# Patient Record
Sex: FEMALE | Race: WHITE | URBAN - METROPOLITAN AREA
[De-identification: names, ages, dates, MRNs, and addresses within clinical notes are randomized per-mention and may not be internally consistent; named-entity substitution may affect disease eponyms.]

---

## 2021-03-24 ENCOUNTER — OFFICE VISIT (OUTPATIENT)
Dept: URBAN - METROPOLITAN AREA CLINIC 54 | Facility: CLINIC | Age: 26
End: 2021-03-24
Payer: COMMERCIAL

## 2021-03-24 ENCOUNTER — WEB ENCOUNTER (OUTPATIENT)
Dept: URBAN - METROPOLITAN AREA CLINIC 54 | Facility: CLINIC | Age: 26
End: 2021-03-24

## 2021-03-24 DIAGNOSIS — R11.2 NON-INTRACTABLE VOMITING WITH NAUSEA, UNSPECIFIED VOMITING TYPE: ICD-10-CM

## 2021-03-24 DIAGNOSIS — K62.5 RECTAL BLEEDING: ICD-10-CM

## 2021-03-24 DIAGNOSIS — K58.2 IRRITABLE BOWEL SYNDROME WITH BOTH CONSTIPATION AND DIARRHEA: ICD-10-CM

## 2021-03-24 DIAGNOSIS — E66.01 MORBID (SEVERE) OBESITY DUE TO EXCESS CALORIES: ICD-10-CM

## 2021-03-24 DIAGNOSIS — K76.0 NAFLD (NONALCOHOLIC FATTY LIVER DISEASE): ICD-10-CM

## 2021-03-24 PROCEDURE — 99204 OFFICE O/P NEW MOD 45 MIN: CPT | Performed by: INTERNAL MEDICINE

## 2021-03-24 RX ORDER — DOCUSATE SODIUM 100 MG/1
1 CAPSULE AS NEEDED CAPSULE, LIQUID FILLED ORAL ONCE A DAY
Status: ON HOLD | COMMUNITY

## 2021-03-24 RX ORDER — LINAGLIPTIN 5 MG/1
1 TABLET TABLET, FILM COATED ORAL ONCE A DAY
Status: ACTIVE | COMMUNITY

## 2021-03-24 RX ORDER — NAPROXEN 500 MG/1
1 TABLET WITH FOOD OR MILK AS NEEDED TABLET ORAL
Status: ON HOLD | COMMUNITY

## 2021-03-24 RX ORDER — LEVOTHYROXINE SODIUM 0.03 MG/1
1 TABLET IN THE MORNING ON AN EMPTY STOMACH TABLET ORAL ONCE A DAY
Status: ACTIVE | COMMUNITY

## 2021-03-24 RX ORDER — SERTRALINE 100 MG/1
1 TABLET TABLET, FILM COATED ORAL ONCE A DAY
Status: ACTIVE | COMMUNITY

## 2021-03-24 RX ORDER — OMEPRAZOLE MAGNESIUM 10 MG/1
AS DIRECTED GRANULE, DELAYED RELEASE ORAL
Status: ON HOLD | COMMUNITY

## 2021-03-24 RX ORDER — PROMETHAZINE HYDROCHLORIDE AND PHENYLEPHRINE HYDROCHLORIDE 6.25; 5 MG/5ML; MG/5ML
5 ML AS NEEDED SOLUTION ORAL
Status: ON HOLD | COMMUNITY

## 2021-03-24 RX ORDER — ARIPIPRAZOLE 5 MG/1
1 TABLET TABLET ORAL ONCE A DAY
Status: ACTIVE | COMMUNITY

## 2021-03-24 RX ORDER — SUCRALFATE 1 G/1
1 TABLET ON AN EMPTY STOMACH TABLET ORAL TWICE A DAY
Status: ON HOLD | COMMUNITY

## 2021-03-24 RX ORDER — LEVOTHYROXINE SODIUM 137 UG/1
1 CAPSULE IN THE MORNING ON AN EMPTY STOMACH CAPSULE ORAL ONCE A DAY
Status: ON HOLD | COMMUNITY

## 2021-03-24 RX ORDER — BUSPIRONE HYDROCHLORIDE 10 MG/1
1 TABLET TABLET ORAL TWICE A DAY
Status: ACTIVE | COMMUNITY

## 2021-03-24 NOTE — HPI-TODAY'S VISIT:
25-year-old lady with morbid obesity BMI 47, diabetes, anxiety depression, and tobacco use who presents to GI clinic for rectal bleeding and nonalcoholic fatty liver disease. Patient reports to me that she is presented to the ED for rectal bleeding in February.  She has had 3-4 episodes where she feels the toilet bowl full of blood.  She also reports fatigue.  She tells me that her bowel movements are alternating we will change in frequency from one every 2 days to 3 to 4/day.  Consistency varies from loose to pebble-like.  She also reports abdominal bloating and distention.  She has not had a prior colonoscopy.  She has not had a prior upper endoscopy. She does not admit to using tobacco products vaping.  She rarely drinks alcohol.  She denies any marijuana or IV drug use. Family history is negative for any colon cancer or inflammatory bowel disease. Her last liver function test in February showed an AST of 51 ALT of 51 total bili 0.3 alk phos 66 hemoglobin was 13.8 platelets 219 INR was 1.2.  CT abdomen in February showed significant hepatomegaly and multiple subcentimeter retroperitoneal mesenteric lymph nodes.

## 2021-03-28 PROBLEM — 10743008: Status: ACTIVE | Noted: 2021-03-24

## 2021-04-29 ENCOUNTER — OFFICE VISIT (OUTPATIENT)
Dept: URBAN - METROPOLITAN AREA SURGERY CENTER 14 | Facility: SURGERY CENTER | Age: 26
End: 2021-04-29

## 2021-07-22 ENCOUNTER — OFFICE VISIT (OUTPATIENT)
Dept: URBAN - METROPOLITAN AREA SURGERY CENTER 14 | Facility: SURGERY CENTER | Age: 26
End: 2021-07-22

## 2022-04-25 ENCOUNTER — OFFICE VISIT (OUTPATIENT)
Dept: URBAN - METROPOLITAN AREA CLINIC 54 | Facility: CLINIC | Age: 27
End: 2022-04-25
Payer: COMMERCIAL

## 2022-04-25 DIAGNOSIS — R11.0 CHRONIC NAUSEA: ICD-10-CM

## 2022-04-25 DIAGNOSIS — K58.0 IRRITABLE BOWEL SYNDROME WITH DIARRHEA: ICD-10-CM

## 2022-04-25 DIAGNOSIS — R79.89 ELEVATED LFTS: ICD-10-CM

## 2022-04-25 PROCEDURE — 99214 OFFICE O/P EST MOD 30 MIN: CPT | Performed by: INTERNAL MEDICINE

## 2022-04-25 RX ORDER — BUSPIRONE HYDROCHLORIDE 10 MG/1
1 TABLET TABLET ORAL TWICE A DAY
Status: ACTIVE | COMMUNITY

## 2022-04-25 RX ORDER — LEVOTHYROXINE SODIUM 137 UG/1
1 CAPSULE IN THE MORNING ON AN EMPTY STOMACH CAPSULE ORAL ONCE A DAY
Status: ACTIVE | COMMUNITY

## 2022-04-25 RX ORDER — LEVOTHYROXINE SODIUM 0.03 MG/1
1 TABLET IN THE MORNING ON AN EMPTY STOMACH TABLET ORAL ONCE A DAY
Status: ACTIVE | COMMUNITY

## 2022-04-25 RX ORDER — SERTRALINE 100 MG/1
1 TABLET TABLET, FILM COATED ORAL ONCE A DAY
Status: ACTIVE | COMMUNITY

## 2022-04-25 RX ORDER — OMEPRAZOLE MAGNESIUM 10 MG/1
AS DIRECTED GRANULE, DELAYED RELEASE ORAL
Status: ACTIVE | COMMUNITY

## 2022-04-25 RX ORDER — LINAGLIPTIN 5 MG/1
1 TABLET TABLET, FILM COATED ORAL ONCE A DAY
Status: DISCONTINUED | COMMUNITY

## 2022-04-25 RX ORDER — DOCUSATE SODIUM 100 MG/1
1 CAPSULE AS NEEDED CAPSULE, LIQUID FILLED ORAL ONCE A DAY
Status: DISCONTINUED | COMMUNITY

## 2022-04-25 RX ORDER — SUCRALFATE 1 G/1
1 TABLET ON AN EMPTY STOMACH TABLET ORAL TWICE A DAY
Status: ACTIVE | COMMUNITY

## 2022-04-25 RX ORDER — NAPROXEN 500 MG/1
1 TABLET WITH FOOD OR MILK AS NEEDED TABLET ORAL
Status: DISCONTINUED | COMMUNITY

## 2022-04-25 RX ORDER — PROMETHAZINE HYDROCHLORIDE AND PHENYLEPHRINE HYDROCHLORIDE 6.25; 5 MG/5ML; MG/5ML
5 ML AS NEEDED SOLUTION ORAL
Status: DISCONTINUED | COMMUNITY

## 2022-04-25 RX ORDER — ARIPIPRAZOLE 5 MG/1
1 TABLET TABLET ORAL ONCE A DAY
Status: ACTIVE | COMMUNITY

## 2022-04-25 RX ORDER — ONDANSETRON HYDROCHLORIDE 8 MG/1
1 CAPSULE TABLET, FILM COATED ORAL
Qty: 90 | Refills: 0 | OUTPATIENT
Start: 2022-04-25 | End: 2022-05-25

## 2022-04-25 RX ORDER — DULAGLUTIDE 1.5 MG/.5ML
AS DIRECTED INJECTION, SOLUTION SUBCUTANEOUS
Status: ACTIVE | COMMUNITY

## 2022-04-25 NOTE — HPI-TODAY'S VISIT:
26-year-old morbidly obese woman with depression anxiety insulin-dependent diabetes and likely nonalcoholic fatty liver disease presents to GI clinic for follow-up after 1 year.  Patient was last seen in March 2021 for nausea vomiting and alternating bowel habits.  At that time we referred the patient for liver work-up and bidirectional endoscopy.  Patient never underwent any of the studies.  She has had 5 ER visits in the last 6 months.  She reports epigastric pain and bilateral lower quadrant pain the last 2 weeks.  She continues have nausea.  She reports his pain is unrelated to food.  She also has persistent alternating bowel habits.  This is been ongoing for a year.  She ranges from 1 bowel movement a day to 5 to 6/day of loose liquidy stool.  She had a CT abdomen on March 14 showed significant hepatomegaly with fatty infiltration of the liver.  Most recent LFTs show an AST of 74 and ALT of 82 with a total bili of 1.7 alk phos of 67.  Urine drug screen in the past has been positive for THC.  Most recent hemoglobin was 10.8 and platelets of 174.

## 2022-04-27 LAB
A/G RATIO: 1.2
ALBUMIN: 4.3
ALKALINE PHOSPHATASE: 70
ALT (SGPT): 59
AST (SGOT): 61
BILIRUBIN, TOTAL: 0.4
BUN/CREATININE RATIO: 11
BUN: 7
CALCIUM: 9.5
CARBON DIOXIDE, TOTAL: 18
CHLORIDE: 103
CREATININE: 0.66
EGFR: 126
ENDOMYSIAL ANTIBODY IGA: NEGATIVE
FERRITIN, SERUM: 40
GLOBULIN, TOTAL: 3.6
GLUCOSE: 301
HBSAG SCREEN: NEGATIVE
HCV AB: <0.1
HEMATOCRIT: 39.9
HEMOGLOBIN: 12
HEP A AB, IGM: NEGATIVE
HEP B CORE AB, IGM: NEGATIVE
IMMUNOGLOBULIN A, QN, SERUM: 266
INR: 1
INTERPRETATION:: (no result)
IRON BIND.CAP.(TIBC): 340
IRON SATURATION: 11
IRON: 36
MCH: 25.2
MCHC: 30.1
MCV: 84
NRBC: (no result)
PLATELETS: 179
POTASSIUM: 4.3
PROTEIN, TOTAL: 7.9
PROTHROMBIN TIME: 10.5
RBC: 4.77
RDW: 15.5
SODIUM: 136
T-TRANSGLUTAMINASE (TTG) IGA: <2
UIBC: 304
WBC: 6.5

## 2022-04-29 LAB — CALPROTECTIN, FECAL: 128

## 2022-05-02 ENCOUNTER — TELEPHONE ENCOUNTER (OUTPATIENT)
Dept: URBAN - METROPOLITAN AREA CLINIC 92 | Facility: CLINIC | Age: 27
End: 2022-05-02

## 2022-05-02 RX ORDER — ONDANSETRON HYDROCHLORIDE 8 MG/1
1 CAPSULE TABLET, FILM COATED ORAL
Qty: 90 | Refills: 0 | Status: ACTIVE | COMMUNITY
Start: 2022-04-25 | End: 2022-05-25

## 2022-05-02 RX ORDER — LEVOTHYROXINE SODIUM 0.03 MG/1
1 TABLET IN THE MORNING ON AN EMPTY STOMACH TABLET ORAL ONCE A DAY
Status: ACTIVE | COMMUNITY

## 2022-05-02 RX ORDER — DULAGLUTIDE 1.5 MG/.5ML
AS DIRECTED INJECTION, SOLUTION SUBCUTANEOUS
Status: ACTIVE | COMMUNITY

## 2022-05-02 RX ORDER — OMEPRAZOLE MAGNESIUM 10 MG/1
AS DIRECTED GRANULE, DELAYED RELEASE ORAL
Status: ACTIVE | COMMUNITY

## 2022-05-02 RX ORDER — LEVOTHYROXINE SODIUM 137 UG/1
1 CAPSULE IN THE MORNING ON AN EMPTY STOMACH CAPSULE ORAL ONCE A DAY
Status: ACTIVE | COMMUNITY

## 2022-05-02 RX ORDER — BUSPIRONE HYDROCHLORIDE 10 MG/1
1 TABLET TABLET ORAL TWICE A DAY
Status: ACTIVE | COMMUNITY

## 2022-05-02 RX ORDER — ARIPIPRAZOLE 5 MG/1
1 TABLET TABLET ORAL ONCE A DAY
Status: ACTIVE | COMMUNITY

## 2022-05-02 RX ORDER — SERTRALINE 100 MG/1
1 TABLET TABLET, FILM COATED ORAL ONCE A DAY
Status: ACTIVE | COMMUNITY

## 2022-05-02 RX ORDER — SODIUM, POTASSIUM,MAG SULFATES 17.5-3.13G
354 ML SOLUTION, RECONSTITUTED, ORAL ORAL
Qty: 354 ML | Refills: 0 | OUTPATIENT
Start: 2022-05-02 | End: 2022-05-03

## 2022-05-02 RX ORDER — SUCRALFATE 1 G/1
1 TABLET ON AN EMPTY STOMACH TABLET ORAL TWICE A DAY
Status: ACTIVE | COMMUNITY

## 2022-05-10 ENCOUNTER — OFFICE VISIT (OUTPATIENT)
Dept: URBAN - METROPOLITAN AREA MEDICAL CENTER 23 | Facility: MEDICAL CENTER | Age: 27
End: 2022-05-10
Payer: COMMERCIAL

## 2022-05-10 ENCOUNTER — TELEPHONE ENCOUNTER (OUTPATIENT)
Dept: URBAN - METROPOLITAN AREA CLINIC 92 | Facility: CLINIC | Age: 27
End: 2022-05-10

## 2022-05-10 DIAGNOSIS — K75.81 STEATOHEPATITIS: ICD-10-CM

## 2022-05-10 DIAGNOSIS — K74.69 CIRRHOSIS, CRYPTOGENIC: ICD-10-CM

## 2022-05-10 DIAGNOSIS — R59.0 INTRA-ABDOMINAL LYMPHADENOPATHY: ICD-10-CM

## 2022-05-10 DIAGNOSIS — K29.60 ADENOPAPILLOMATOSIS GASTRICA: ICD-10-CM

## 2022-05-10 PROCEDURE — 43242 EGD US FINE NEEDLE BX/ASPIR: CPT | Performed by: INTERNAL MEDICINE

## 2022-05-10 PROCEDURE — 43239 EGD BIOPSY SINGLE/MULTIPLE: CPT | Performed by: INTERNAL MEDICINE

## 2022-05-10 RX ORDER — ARIPIPRAZOLE 5 MG/1
1 TABLET TABLET ORAL ONCE A DAY
Status: ACTIVE | COMMUNITY

## 2022-05-10 RX ORDER — LEVOTHYROXINE SODIUM 137 UG/1
1 CAPSULE IN THE MORNING ON AN EMPTY STOMACH CAPSULE ORAL ONCE A DAY
Status: ACTIVE | COMMUNITY

## 2022-05-10 RX ORDER — DULAGLUTIDE 1.5 MG/.5ML
AS DIRECTED INJECTION, SOLUTION SUBCUTANEOUS
Status: ACTIVE | COMMUNITY

## 2022-05-10 RX ORDER — LEVOTHYROXINE SODIUM 0.03 MG/1
1 TABLET IN THE MORNING ON AN EMPTY STOMACH TABLET ORAL ONCE A DAY
Status: ACTIVE | COMMUNITY

## 2022-05-10 RX ORDER — SERTRALINE 100 MG/1
1 TABLET TABLET, FILM COATED ORAL ONCE A DAY
Status: ACTIVE | COMMUNITY

## 2022-05-10 RX ORDER — ONDANSETRON HYDROCHLORIDE 8 MG/1
1 CAPSULE TABLET, FILM COATED ORAL
Qty: 90 | Refills: 0 | Status: ACTIVE | COMMUNITY
Start: 2022-04-25 | End: 2022-05-25

## 2022-05-10 RX ORDER — SUCRALFATE 1 G/1
1 TABLET ON AN EMPTY STOMACH TABLET ORAL TWICE A DAY
Status: ACTIVE | COMMUNITY

## 2022-05-10 RX ORDER — BUSPIRONE HYDROCHLORIDE 10 MG/1
1 TABLET TABLET ORAL TWICE A DAY
Status: ACTIVE | COMMUNITY

## 2022-05-10 RX ORDER — OMEPRAZOLE MAGNESIUM 10 MG/1
AS DIRECTED GRANULE, DELAYED RELEASE ORAL
Status: ACTIVE | COMMUNITY

## 2022-05-19 ENCOUNTER — TELEPHONE ENCOUNTER (OUTPATIENT)
Dept: URBAN - METROPOLITAN AREA CLINIC 54 | Facility: CLINIC | Age: 27
End: 2022-05-19

## 2022-05-20 ENCOUNTER — TELEPHONE ENCOUNTER (OUTPATIENT)
Dept: URBAN - METROPOLITAN AREA CLINIC 54 | Facility: CLINIC | Age: 27
End: 2022-05-20

## 2022-05-23 ENCOUNTER — TELEPHONE ENCOUNTER (OUTPATIENT)
Dept: URBAN - METROPOLITAN AREA CLINIC 54 | Facility: CLINIC | Age: 27
End: 2022-05-23

## 2022-05-25 ENCOUNTER — CLAIMS CREATED FROM THE CLAIM WINDOW (OUTPATIENT)
Dept: URBAN - METROPOLITAN AREA CLINIC 4 | Facility: CLINIC | Age: 27
End: 2022-05-25
Payer: COMMERCIAL

## 2022-05-25 ENCOUNTER — OFFICE VISIT (OUTPATIENT)
Dept: URBAN - METROPOLITAN AREA SURGERY CENTER 14 | Facility: SURGERY CENTER | Age: 27
End: 2022-05-25
Payer: COMMERCIAL

## 2022-05-25 DIAGNOSIS — K52.9 CHRONIC DIARRHEA: ICD-10-CM

## 2022-05-25 DIAGNOSIS — K63.89 OTHER SPECIFIED DISEASES OF INTESTINE: ICD-10-CM

## 2022-05-25 PROCEDURE — 45380 COLONOSCOPY AND BIOPSY: CPT | Performed by: INTERNAL MEDICINE

## 2022-05-25 PROCEDURE — 88313 SPECIAL STAINS GROUP 2: CPT | Performed by: PATHOLOGY

## 2022-05-25 PROCEDURE — 88305 TISSUE EXAM BY PATHOLOGIST: CPT | Performed by: PATHOLOGY

## 2022-05-25 PROCEDURE — 88342 IMHCHEM/IMCYTCHM 1ST ANTB: CPT | Performed by: PATHOLOGY

## 2022-05-25 PROCEDURE — G8907 PT DOC NO EVENTS ON DISCHARG: HCPCS | Performed by: INTERNAL MEDICINE

## 2022-05-25 RX ORDER — DULAGLUTIDE 1.5 MG/.5ML
AS DIRECTED INJECTION, SOLUTION SUBCUTANEOUS
Status: ACTIVE | COMMUNITY

## 2022-05-25 RX ORDER — ARIPIPRAZOLE 5 MG/1
1 TABLET TABLET ORAL ONCE A DAY
Status: ACTIVE | COMMUNITY

## 2022-05-25 RX ORDER — BUSPIRONE HYDROCHLORIDE 10 MG/1
1 TABLET TABLET ORAL TWICE A DAY
Status: ACTIVE | COMMUNITY

## 2022-05-25 RX ORDER — LEVOTHYROXINE SODIUM 137 UG/1
1 CAPSULE IN THE MORNING ON AN EMPTY STOMACH CAPSULE ORAL ONCE A DAY
Status: ACTIVE | COMMUNITY

## 2022-05-25 RX ORDER — SUCRALFATE 1 G/1
1 TABLET ON AN EMPTY STOMACH TABLET ORAL TWICE A DAY
Status: ACTIVE | COMMUNITY

## 2022-05-25 RX ORDER — SERTRALINE 100 MG/1
1 TABLET TABLET, FILM COATED ORAL ONCE A DAY
Status: ACTIVE | COMMUNITY

## 2022-05-25 RX ORDER — OMEPRAZOLE MAGNESIUM 10 MG/1
AS DIRECTED GRANULE, DELAYED RELEASE ORAL
Status: ACTIVE | COMMUNITY

## 2022-05-25 RX ORDER — LEVOTHYROXINE SODIUM 0.03 MG/1
1 TABLET IN THE MORNING ON AN EMPTY STOMACH TABLET ORAL ONCE A DAY
Status: ACTIVE | COMMUNITY

## 2022-05-25 RX ORDER — ONDANSETRON HYDROCHLORIDE 8 MG/1
1 CAPSULE TABLET, FILM COATED ORAL
Qty: 90 | Refills: 0 | Status: ACTIVE | COMMUNITY
Start: 2022-04-25 | End: 2022-05-25

## 2022-06-06 ENCOUNTER — TELEPHONE ENCOUNTER (OUTPATIENT)
Dept: URBAN - METROPOLITAN AREA CLINIC 54 | Facility: CLINIC | Age: 27
End: 2022-06-06

## 2022-06-08 ENCOUNTER — OFFICE VISIT (OUTPATIENT)
Dept: URBAN - NONMETROPOLITAN AREA CLINIC 4 | Facility: CLINIC | Age: 27
End: 2022-06-08
Payer: COMMERCIAL

## 2022-06-08 ENCOUNTER — LAB OUTSIDE AN ENCOUNTER (OUTPATIENT)
Dept: URBAN - NONMETROPOLITAN AREA CLINIC 4 | Facility: CLINIC | Age: 27
End: 2022-06-08

## 2022-06-08 VITALS
HEART RATE: 91 BPM | WEIGHT: 293 LBS | HEIGHT: 67 IN | TEMPERATURE: 98.1 F | SYSTOLIC BLOOD PRESSURE: 139 MMHG | DIASTOLIC BLOOD PRESSURE: 79 MMHG | BODY MASS INDEX: 45.99 KG/M2

## 2022-06-08 DIAGNOSIS — R11.0 CHRONIC NAUSEA: ICD-10-CM

## 2022-06-08 DIAGNOSIS — K58.0 IRRITABLE BOWEL SYNDROME WITH DIARRHEA: ICD-10-CM

## 2022-06-08 DIAGNOSIS — Z79.4 LONG TERM (CURRENT) USE OF INSULIN: ICD-10-CM

## 2022-06-08 DIAGNOSIS — K75.81 NONALCOHOLIC STEATOHEPATITIS (NASH): ICD-10-CM

## 2022-06-08 DIAGNOSIS — E66.01 MORBID OBESITY: ICD-10-CM

## 2022-06-08 DIAGNOSIS — E11.9 TYPE 2 DIABETES MELLITUS WITHOUT COMPLICATIONS: ICD-10-CM

## 2022-06-08 DIAGNOSIS — K74.60 UNSPECIFIED CIRRHOSIS OF LIVER: ICD-10-CM

## 2022-06-08 DIAGNOSIS — D50.8 ACQUIRED IRON DEFICIENCY ANEMIA DUE TO DECREASED ABSORPTION: ICD-10-CM

## 2022-06-08 DIAGNOSIS — R93.5 ABNORMAL CT SCAN, PELVIS: ICD-10-CM

## 2022-06-08 PROBLEM — 249612005: Status: ACTIVE | Noted: 2022-05-11

## 2022-06-08 PROBLEM — 724556004: Status: ACTIVE | Noted: 2022-05-02

## 2022-06-08 PROBLEM — 710815001: Status: ACTIVE | Noted: 2022-06-08

## 2022-06-08 PROBLEM — 266468003: Status: ACTIVE | Noted: 2022-06-08

## 2022-06-08 PROBLEM — 197125005: Status: ACTIVE | Noted: 2022-04-25

## 2022-06-08 PROBLEM — 19943007: Status: ACTIVE | Noted: 2022-06-08

## 2022-06-08 PROBLEM — 422587007: Status: ACTIVE | Noted: 2022-04-28

## 2022-06-08 PROBLEM — 237599002: Status: ACTIVE | Noted: 2022-06-08

## 2022-06-08 PROCEDURE — 99215 OFFICE O/P EST HI 40 MIN: CPT | Performed by: INTERNAL MEDICINE

## 2022-06-08 PROCEDURE — 99214 OFFICE O/P EST MOD 30 MIN: CPT | Performed by: INTERNAL MEDICINE

## 2022-06-08 RX ORDER — LEVOTHYROXINE SODIUM 137 UG/1
1 CAPSULE IN THE MORNING ON AN EMPTY STOMACH CAPSULE ORAL ONCE A DAY
Status: ACTIVE | COMMUNITY

## 2022-06-08 RX ORDER — DULAGLUTIDE 1.5 MG/.5ML
AS DIRECTED INJECTION, SOLUTION SUBCUTANEOUS WEEKLY
Status: ACTIVE | COMMUNITY

## 2022-06-08 RX ORDER — BUSPIRONE HYDROCHLORIDE 10 MG/1
1 TABLET TABLET ORAL TWICE A DAY
Status: ACTIVE | COMMUNITY

## 2022-06-08 RX ORDER — LEVOTHYROXINE SODIUM 0.03 MG/1
1 TABLET IN THE MORNING ON AN EMPTY STOMACH TABLET ORAL ONCE A DAY
Status: ACTIVE | COMMUNITY

## 2022-06-08 RX ORDER — ARIPIPRAZOLE 5 MG/1
1 TABLET TABLET ORAL ONCE A DAY
Status: ACTIVE | COMMUNITY

## 2022-06-08 RX ORDER — SUCRALFATE 1 G/1
1 TABLET ON AN EMPTY STOMACH TABLET ORAL TWICE A DAY
Status: ACTIVE | COMMUNITY

## 2022-06-08 RX ORDER — INSULIN GLARGINE 300 U/ML
AS DIRECTED INJECTION, SOLUTION SUBCUTANEOUS
Status: ACTIVE | COMMUNITY

## 2022-06-08 RX ORDER — SERTRALINE 100 MG/1
1 TABLET TABLET, FILM COATED ORAL ONCE A DAY
Status: ACTIVE | COMMUNITY

## 2022-06-08 RX ORDER — OMEPRAZOLE MAGNESIUM 10 MG/1
AS DIRECTED GRANULE, DELAYED RELEASE ORAL
Status: ACTIVE | COMMUNITY

## 2022-06-08 NOTE — HPI-TODAY'S VISIT:
26-year-old morbidly obese woman with depression anxiety insulin-dependent diabetes and likely nonalcoholic fatty liver disease presents to GI clinic for follow-up after 1 year.  Patient was last seen in March 2021 for nausea vomiting and alternating bowel habits.  At that time we referred the patient for liver work-up and bidirectional endoscopy.  Patient never underwent any of the studies.  She has had 5 ER visits in the last 6 months.  She reports epigastric pain and bilateral lower quadrant pain the last 2 weeks.  She continues have nausea.  She reports his pain is unrelated to food.  She also has persistent alternating bowel habits.  This is been ongoing for a year.  She ranges from 1 bowel movement a day to 5 to 6/day of loose liquidy stool.  She had a CT abdomen on March 14 showed significant hepatomegaly with fatty infiltration of the liver.  Most recent LFTs show an AST of 74 and ALT of 82 with a total bili of 1.7 alk phos of 67.  Urine drug screen in the past has been positive for THC.  Most recent hemoglobin was 10.8 and platelets of 174.  6/8/22: Pt RTC for f/u. Had EGD/EUS 5/10/22: - No evidence of choledocholithiases/cholelithiasis - Hepatosplenomegaly - Markedly hyperechoic liver (concerning for BELL) s/p liver FNB - Intraabdominal lymphadenopathy of unclear etiology. 1 cm celiac lymph node s/p FNB. - Unremarkable upper endoscopy exam.  S/p gastric biopsies for H. Pylori. Gastric Bx -Mild to moderate chronic gastritis. Neg for HPY. Liver bx showing BELL cirrhosis  Had cscope 5/25/22: - The examined portion of the ileum was normal. - The sigmoid colon, descending colon, splenic flexure, transverse colon, hepatic flexure, ascending colon and cecum are normal. Biopsied. - Internal hemorrhoids. Bx pending  She went to [facility] ED on 6/6/22 with c/o vomiting coffee-ground emesis as well as having dark tarry stools.  Patient reports the emesis started as redness, and then progressed to brown/black.  Patient reports associated upper abdominal pain. BG was noted at 445. HgbA1c 11.8. Hgb 11.9. AST 72, ALT 61, Tbili and Alk phos wnl. INR 1.15. UA neg. FOBT neg.   Since discharge, she continues to c/o upper abdominal pain with associated nausea. Denies any further vomiting. Contiues to have alternating bowel habits, bloating and gas. She is following low FODMAP diet.  She does not currently follow an endocrinologist. She is on Trulicity and Toujeo. She lives in [place].

## 2022-07-18 ENCOUNTER — TELEPHONE ENCOUNTER (OUTPATIENT)
Dept: URBAN - METROPOLITAN AREA CLINIC 54 | Facility: CLINIC | Age: 27
End: 2022-07-18

## 2022-07-25 ENCOUNTER — OFFICE VISIT (OUTPATIENT)
Dept: URBAN - METROPOLITAN AREA CLINIC 54 | Facility: CLINIC | Age: 27
End: 2022-07-25
Payer: COMMERCIAL

## 2022-07-25 VITALS
SYSTOLIC BLOOD PRESSURE: 129 MMHG | TEMPERATURE: 97.7 F | HEART RATE: 82 BPM | WEIGHT: 293 LBS | DIASTOLIC BLOOD PRESSURE: 80 MMHG | HEIGHT: 67 IN | BODY MASS INDEX: 45.99 KG/M2

## 2022-07-25 DIAGNOSIS — R79.89 ELEVATED LFTS: ICD-10-CM

## 2022-07-25 DIAGNOSIS — E66.01 MORBID (SEVERE) OBESITY DUE TO EXCESS CALORIES: ICD-10-CM

## 2022-07-25 DIAGNOSIS — K76.0 NAFLD (NONALCOHOLIC FATTY LIVER DISEASE): ICD-10-CM

## 2022-07-25 PROBLEM — 863927004: Status: ACTIVE | Noted: 2022-04-28

## 2022-07-25 PROBLEM — 408512008: Status: ACTIVE | Noted: 2021-03-24

## 2022-07-25 PROBLEM — 83911000119104: Status: ACTIVE | Noted: 2021-03-24

## 2022-07-25 PROBLEM — 197315008: Status: ACTIVE | Noted: 2021-03-24

## 2022-07-25 PROCEDURE — 99214 OFFICE O/P EST MOD 30 MIN: CPT | Performed by: INTERNAL MEDICINE

## 2022-07-25 RX ORDER — LEVOTHYROXINE SODIUM 0.03 MG/1
1 TABLET IN THE MORNING ON AN EMPTY STOMACH TABLET ORAL ONCE A DAY
Status: ACTIVE | COMMUNITY

## 2022-07-25 RX ORDER — BUSPIRONE HYDROCHLORIDE 10 MG/1
1 TABLET TABLET ORAL TWICE A DAY
Status: ACTIVE | COMMUNITY

## 2022-07-25 RX ORDER — OMEPRAZOLE MAGNESIUM 10 MG/1
AS DIRECTED GRANULE, DELAYED RELEASE ORAL
Status: ACTIVE | COMMUNITY

## 2022-07-25 RX ORDER — SERTRALINE 100 MG/1
1 TABLET TABLET, FILM COATED ORAL ONCE A DAY
Status: ACTIVE | COMMUNITY

## 2022-07-25 RX ORDER — INSULIN GLARGINE 300 U/ML
AS DIRECTED INJECTION, SOLUTION SUBCUTANEOUS
Status: ACTIVE | COMMUNITY

## 2022-07-25 RX ORDER — ARIPIPRAZOLE 5 MG/1
1 TABLET TABLET ORAL ONCE A DAY
Status: ACTIVE | COMMUNITY

## 2022-07-25 RX ORDER — LITHIUM CARBONATE 600 MG/1
2 CAPSULE CAPSULE, GELATIN COATED ORAL ONCE A DAY
Status: ACTIVE | COMMUNITY

## 2022-07-25 RX ORDER — SUCRALFATE 1 G/1
1 TABLET ON AN EMPTY STOMACH TABLET ORAL TWICE A DAY
Status: ACTIVE | COMMUNITY

## 2022-07-25 RX ORDER — DULAGLUTIDE 3 MG/.5ML
AS DIRECTED INJECTION, SOLUTION SUBCUTANEOUS WEEKLY
Status: ACTIVE | COMMUNITY

## 2022-07-25 RX ORDER — LEVOTHYROXINE SODIUM 137 UG/1
1 CAPSULE IN THE MORNING ON AN EMPTY STOMACH CAPSULE ORAL ONCE A DAY
Status: ACTIVE | COMMUNITY

## 2022-07-25 NOTE — HPI-TODAY'S VISIT:
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 Patient recently quit marijuana use in February and alcohol use 1 year ago.  She has been noted to have significant hepatosplenomegaly and elevated liver function test.  She underwent EGD with EUS May 2022 in which she was found to have hyperechoic appearing hepatic parenchyma and splenomegaly.  She underwent gastric biopsies which were negative for H. pylori.  A peripancreatic lymph node was biopsied and found to be benign.  EUS guided liver biopsy showed stage IV steatohepatitis.  A subsequent colonoscopy in May 2022 showed normal-appearing terminal ileum and colon with small internal hemorrhoids.  Random colon biopsies were negative for microscopic colitis.  CT abdomen pelvis in May 2022 showed hepatosplenomegaly.  She had a negative celiac panel, fecal calprotectin 128, hepatitis AB and C serology were negative.  Percent iron was 11 her ferritin was 40.  Last platelets were 179 last AST was 61 with ALT was 59 total bili 0.4 and alk phos 70.  Last INR was 1.

## 2022-07-26 LAB
A/G RATIO: 1.2
ALBUMIN: 4
ALKALINE PHOSPHATASE: 52
ALT (SGPT): 49
AST (SGOT): 53
BILIRUBIN, TOTAL: 0.4
BUN/CREATININE RATIO: (no result)
BUN: 9
CALCIUM: 9.4
CARBON DIOXIDE, TOTAL: 22
CHLORIDE: 108
CREATININE: 0.65
EGFR: 125
GLOBULIN, TOTAL: 3.3
GLUCOSE: 334
HEMATOCRIT: 38.5
HEMOGLOBIN: 11.8
INR: 1
MCH: 25.8
MCHC: 30.6
MCV: 84.2
MPV: 12.2
PLATELET COUNT: 170
POTASSIUM: 4.3
PROTEIN, TOTAL: 7.3
PT: 10.3
RDW: 14.1
RED BLOOD CELL COUNT: 4.57
SODIUM: 134
WHITE BLOOD CELL COUNT: 5.2

## 2022-08-29 ENCOUNTER — TELEPHONE ENCOUNTER (OUTPATIENT)
Dept: URBAN - METROPOLITAN AREA CLINIC 54 | Facility: CLINIC | Age: 27
End: 2022-08-29

## 2022-10-20 ENCOUNTER — OFFICE VISIT (OUTPATIENT)
Dept: URBAN - METROPOLITAN AREA CLINIC 54 | Facility: CLINIC | Age: 27
End: 2022-10-20

## 2022-10-20 RX ORDER — SUCRALFATE 1 G/1
1 TABLET ON AN EMPTY STOMACH TABLET ORAL TWICE A DAY
Status: ACTIVE | COMMUNITY

## 2022-10-20 RX ORDER — ARIPIPRAZOLE 5 MG/1
1 TABLET TABLET ORAL ONCE A DAY
Status: ACTIVE | COMMUNITY

## 2022-10-20 RX ORDER — LEVOTHYROXINE SODIUM 137 UG/1
1 CAPSULE IN THE MORNING ON AN EMPTY STOMACH CAPSULE ORAL ONCE A DAY
Status: ACTIVE | COMMUNITY

## 2022-10-20 RX ORDER — SERTRALINE 100 MG/1
1 TABLET TABLET, FILM COATED ORAL ONCE A DAY
Status: ACTIVE | COMMUNITY

## 2022-10-20 RX ORDER — BUSPIRONE HYDROCHLORIDE 10 MG/1
1 TABLET TABLET ORAL TWICE A DAY
Status: ACTIVE | COMMUNITY

## 2022-10-20 RX ORDER — INSULIN GLARGINE 300 U/ML
AS DIRECTED INJECTION, SOLUTION SUBCUTANEOUS
Status: ACTIVE | COMMUNITY

## 2022-10-20 RX ORDER — OMEPRAZOLE MAGNESIUM 10 MG/1
AS DIRECTED GRANULE, DELAYED RELEASE ORAL
Status: ACTIVE | COMMUNITY

## 2022-10-20 RX ORDER — LITHIUM CARBONATE 600 MG/1
2 CAPSULE CAPSULE, GELATIN COATED ORAL ONCE A DAY
Status: ACTIVE | COMMUNITY

## 2022-10-20 RX ORDER — LEVOTHYROXINE SODIUM 0.03 MG/1
1 TABLET IN THE MORNING ON AN EMPTY STOMACH TABLET ORAL ONCE A DAY
Status: ACTIVE | COMMUNITY

## 2022-10-20 RX ORDER — DULAGLUTIDE 3 MG/.5ML
AS DIRECTED INJECTION, SOLUTION SUBCUTANEOUS WEEKLY
Status: ACTIVE | COMMUNITY

## 2023-01-27 ENCOUNTER — OFFICE VISIT (OUTPATIENT)
Dept: URBAN - METROPOLITAN AREA CLINIC 54 | Facility: CLINIC | Age: 28
End: 2023-01-27

## 2023-03-27 ENCOUNTER — DASHBOARD ENCOUNTERS (OUTPATIENT)
Age: 28
End: 2023-03-27

## 2023-03-27 ENCOUNTER — OFFICE VISIT (OUTPATIENT)
Dept: URBAN - METROPOLITAN AREA CLINIC 54 | Facility: CLINIC | Age: 28
End: 2023-03-27
Payer: COMMERCIAL

## 2023-03-27 VITALS
HEART RATE: 93 BPM | TEMPERATURE: 97.4 F | DIASTOLIC BLOOD PRESSURE: 71 MMHG | SYSTOLIC BLOOD PRESSURE: 118 MMHG | HEIGHT: 67 IN | WEIGHT: 279.8 LBS | BODY MASS INDEX: 43.92 KG/M2

## 2023-03-27 DIAGNOSIS — K76.0 NAFLD (NONALCOHOLIC FATTY LIVER DISEASE): ICD-10-CM

## 2023-03-27 DIAGNOSIS — R79.89 ELEVATED LFTS: ICD-10-CM

## 2023-03-27 PROCEDURE — 99214 OFFICE O/P EST MOD 30 MIN: CPT | Performed by: INTERNAL MEDICINE

## 2023-03-27 RX ORDER — SUCRALFATE 1 G/1
1 TABLET ON AN EMPTY STOMACH TABLET ORAL TWICE A DAY
Status: DISCONTINUED | COMMUNITY

## 2023-03-27 RX ORDER — SERTRALINE 100 MG/1
1 TABLET TABLET, FILM COATED ORAL ONCE A DAY
Status: DISCONTINUED | COMMUNITY

## 2023-03-27 RX ORDER — ARIPIPRAZOLE 5 MG/1
1 TABLET TABLET ORAL ONCE A DAY
Status: DISCONTINUED | COMMUNITY

## 2023-03-27 RX ORDER — OMEPRAZOLE MAGNESIUM 10 MG/1
AS DIRECTED GRANULE, DELAYED RELEASE ORAL
Status: DISCONTINUED | COMMUNITY

## 2023-03-27 RX ORDER — BUSPIRONE HYDROCHLORIDE 10 MG/1
1 TABLET TABLET ORAL TWICE A DAY
Status: DISCONTINUED | COMMUNITY

## 2023-03-27 RX ORDER — LEVOTHYROXINE SODIUM 150 UG/1
1 TABLET IN THE MORNING ON AN EMPTY STOMACH TABLET ORAL ONCE A DAY
Status: ACTIVE | COMMUNITY

## 2023-03-27 RX ORDER — LEVOTHYROXINE SODIUM 137 UG/1
1 CAPSULE IN THE MORNING ON AN EMPTY STOMACH CAPSULE ORAL ONCE A DAY
Status: DISCONTINUED | COMMUNITY

## 2023-03-27 RX ORDER — LITHIUM CARBONATE 600 MG/1
2 CAPSULE CAPSULE, GELATIN COATED ORAL ONCE A DAY
Status: DISCONTINUED | COMMUNITY

## 2023-03-27 RX ORDER — DULAGLUTIDE 3 MG/.5ML
AS DIRECTED INJECTION, SOLUTION SUBCUTANEOUS WEEKLY
Status: DISCONTINUED | COMMUNITY

## 2023-03-27 RX ORDER — INSULIN GLARGINE 300 U/ML
AS DIRECTED INJECTION, SOLUTION SUBCUTANEOUS
Status: DISCONTINUED | COMMUNITY

## 2023-03-27 NOTE — HPI-TODAY'S VISIT:
27-year-old woman with morbid obesity and Guerrero cirrhosis compensated, IBS and cannabinoid hyperemesis syndrome presents to GI clinic for follow-up.  Very encouraged today the patient has lost a significant amount of weight since her last clinic visit.  She reports that she has cut out fast food and sugar.  She is only drinking water at this time.  Her last liver function test show an AST of 58 ALT of 49 total bili 0.4 and alk phos of 54.  Last colonoscopy in May 2022 showed internal hemorrhoids.  Patient was referred to bariatric surgery however was discouraged by her mother.

## 2023-03-28 LAB
ALBUMIN/GLOBULIN RATIO: 1.3
ALBUMIN: 4.2
ALKALINE PHOSPHATASE: 52
ALT (SGPT): 42
AST (SGOT): 40
BILIRUBIN, DIRECT: 0.1
BILIRUBIN, INDIRECT: 0.4
BILIRUBIN, TOTAL: 0.5
GLOBULIN: 3.2
PROTEIN, TOTAL: 7.4

## 2023-09-25 ENCOUNTER — OFFICE VISIT (OUTPATIENT)
Dept: URBAN - METROPOLITAN AREA CLINIC 54 | Facility: CLINIC | Age: 28
End: 2023-09-25

## 2023-10-10 ENCOUNTER — OFFICE VISIT (OUTPATIENT)
Dept: URBAN - METROPOLITAN AREA CLINIC 54 | Facility: CLINIC | Age: 28
End: 2023-10-10